# Patient Record
Sex: FEMALE | Race: WHITE | Employment: OTHER | URBAN - METROPOLITAN AREA
[De-identification: names, ages, dates, MRNs, and addresses within clinical notes are randomized per-mention and may not be internally consistent; named-entity substitution may affect disease eponyms.]

---

## 2017-08-30 ENCOUNTER — OFFICE VISIT (OUTPATIENT)
Dept: SURGERY | Age: 72
End: 2017-08-30

## 2017-08-30 ENCOUNTER — DOCUMENTATION ONLY (OUTPATIENT)
Dept: SURGERY | Age: 72
End: 2017-08-30

## 2017-08-30 ENCOUNTER — TELEPHONE (OUTPATIENT)
Dept: SURGERY | Age: 72
End: 2017-08-30

## 2017-08-30 VITALS
BODY MASS INDEX: 22.5 KG/M2 | WEIGHT: 140 LBS | DIASTOLIC BLOOD PRESSURE: 78 MMHG | HEART RATE: 67 BPM | HEIGHT: 66 IN | SYSTOLIC BLOOD PRESSURE: 135 MMHG

## 2017-08-30 DIAGNOSIS — N60.92 ATYPICAL LOBULAR HYPERPLASIA (ALH) OF LEFT BREAST: Primary | ICD-10-CM

## 2017-08-30 RX ORDER — LEVOTHYROXINE SODIUM 75 UG/1
TABLET ORAL
Refills: 0 | COMMUNITY
Start: 2017-08-13

## 2017-08-30 RX ORDER — SULFAMETHOXAZOLE AND TRIMETHOPRIM 800; 160 MG/1; MG/1
TABLET ORAL
Refills: 1 | COMMUNITY
Start: 2017-08-25

## 2017-08-30 RX ORDER — SIMVASTATIN 40 MG/1
TABLET, FILM COATED ORAL
Refills: 1 | COMMUNITY
Start: 2017-08-25

## 2017-08-30 NOTE — PROGRESS NOTES
Type of Film: [x] CD [] FILMS  Type of Test: [] MRI [x] MAMMO  From: [facility]  Given to:  [facility] [address]  To be Downloaded into PACS:  YES    Radiologist will review films for Dr. Lesly Lara prior to third biopsy.

## 2017-08-30 NOTE — PATIENT INSTRUCTIONS
Open Breast Biopsy: Before Your Surgery  What is an open breast biopsy? An open breast biopsy is surgery to take a sample of breast tissue. It may be done to check a lump found during a breast exam. Or it may be done to check an area of concern found on a mammogram or ultrasound. If the doctor can't feel the lump, a small wire can be put in the area during a mammogram or ultrasound just before surgery. The tip of the wire will guide the doctor to the area to be checked. The doctor will make a small cut in the breast to remove a piece of tissue. The cut is called an incision. If the lump or suspicious area is small, the doctor may be able to take out the entire lump or area. The doctor will close the incision with stitches. The breast tissue will be sent to a lab. There it will be examined under a microscope to check for breast cancer. Your doctor may get some answers right away. But it can take up to 1 to 2 weeks to get the final results. You will be able to go home on the same day as the biopsy. Most women are able to go back to work in 1 or 2 days. This depends on how you feel and the type of work you do. For 2 weeks after surgery, you will need to avoid bouncing and strenuous activities that involve the upper body. The surgery will leave a small scar on your breast that will fade with time. Less often, the surgery may leave a dent in the breast. You may be able to feel a hard area where the biopsy was done. This is a normal part of the healing process. It does not mean that the lump is growing back. The area will get softer in the weeks after surgery. Follow-up care is a key part of your treatment and safety. Be sure to make and go to all appointments, and call your doctor if you are having problems. It's also a good idea to know your test results and keep a list of the medicines you take. What happens before surgery? Surgery can be stressful.  This information will help you understand what you can expect. And it will help you safely prepare for surgery. Preparing for surgery  · Understand exactly what surgery is planned, along with the risks, benefits, and other options. · Tell your doctors ALL the medicines, vitamins, supplements, and herbal remedies you take. Some of these can increase the risk of bleeding or interact with anesthesia. · If you take blood thinners, such as warfarin (Coumadin), clopidogrel (Plavix), or aspirin, be sure to talk to your doctor. He or she will tell you if you should stop taking these medicines before your surgery. Make sure that you understand exactly what your doctor wants you to do. · Your doctor will tell you which medicines to take or stop before your surgery. You may need to stop taking certain medicines a week or more before surgery. So talk to your doctor as soon as you can. · If you have an advance directive, let your doctor know. It may include a living will and a durable power of  for health care. Bring a copy to the hospital. If you don't have one, you may want to prepare one. It lets your doctor and loved ones know your health care wishes. Doctors advise that everyone prepare these papers before any type of surgery or procedure. What happens on the day of surgery? · Follow the instructions exactly about when to stop eating and drinking. If you don't, your surgery may be canceled. If your doctor told you to take your medicines on the day of surgery, take them with only a sip of water. · Take a bath or shower before you come in for your surgery. Do not apply lotions, perfumes, deodorants, or nail polish. · Do not shave the surgical site yourself. · Take off all jewelry and piercings. And take out contact lenses, if you wear them. · Bring a comfortable, supportive bra with you. For the first 3 days after surgery, you will need to wear this all the time, even at night. At the hospital or surgery center  · Bring a picture ID.   · The area for surgery is often marked to make sure there are no errors. If needed, you will get a mammogram or ultrasound to diaana the area. · You will be kept comfortable and safe by your anesthesia provider. The anesthesia may make you sleep. Or it may just numb the area being worked on. · The surgery will take about 1 hour. Going home  · Be sure you have someone to drive you home. Anesthesia and pain medicine make it unsafe for you to drive. · You will be given more specific instructions about recovering from your surgery. They will cover things like diet, wound care, follow-up care, driving, and getting back to your normal routine. When should you call your doctor? · You have questions or concerns. · You don't understand how to prepare for your surgery. · You become ill before the surgery (such as fever, flu, or a cold). · You need to reschedule or have changed your mind about having the surgery. Where can you learn more? Go to http://andie-maggie.info/. Enter P389 in the search box to learn more about \"Open Breast Biopsy: Before Your Surgery. \"  Current as of: July 26, 2016  Content Version: 11.3  © 8148-8457 [employer]. Care instructions adapted under license by [employer] (which disclaims liability or warranty for this information). If you have questions about a medical condition or this instruction, always ask your healthcare professional. [address] any warranty or liability for your use of this information.

## 2017-08-30 NOTE — PROGRESS NOTES
HISTORY OF PRESENT ILLNESS  Jose Braga is a 67 y.o. female. HPI   NEW patient presents for consultation at the request of Dr. Brent Chambers for new diagnosis of LEFT breast [place]. Feels no palpable breast lumps, has no breast pain, no nipple discharge. Does have a somewhat inverted nipple on the LEFT, but this is not new. Screening mammogram revealed two abnormal areas which were biopsied separately with stereotactic biopsy. Second biopsy performed on the LEFT breast was benign, per patient. Had a breast MRI yesterday, and the radiologist is recommending a biopsy of a third site in the LEFT breast.  Patient is quite bruised after her two recent biopsies. She has a history of an excisional biopsy of the LEFT breast in 2010 by Dr. Martin Villaseñor for atypical lobular hyperplasia. FH is significant for a paternal aunt who had breast cancer in her 35s (survivor). All recent imaging has been at [facility]. Past Medical History:   Diagnosis Date    Atypical lobular hyperplasia [facility]) of left breast 2010, 2017       Past Surgical History:   Procedure Laterality Date    BIOPSY OF BREAST, INCISIONAL Left 2010    for [place], by Dr. Leroy Barry History     Social History    Marital status:      Spouse name: N/A    Number of children: N/A    Years of education: N/A     Occupational History    Not on file. Social History Main Topics    Smoking status: Former Smoker     Quit date: 8/30/1979    Smokeless tobacco: Never Used    Alcohol use Yes    Drug use: Not on file    Sexual activity: Not on file     Other Topics Concern    Not on file     Social History Narrative    No narrative on file       No current outpatient prescriptions on file prior to visit. No current facility-administered medications on file prior to visit. No Known Allergies    OB History     Obstetric Comments    Menarche:  15. LMP: 39.  # of Children:  3.   Age at Delivery of First Child:  21.   Hysterectomy/oophorectomy:  NO/NO. Breast Bx:  Yes, LEFT times three. Hx of Breast Feeding:  Yes. BCP:  No. Hormone therapy:  Yes, in the past.             ROS  Constitutional: Negative    HENT: Negative. Eyes: Negative. Respiratory: Negative. Cardiovascular: Negative. Gastrointestinal: Negative. Genitourinary: Negative. Musculoskeletal: Negative. Skin: Negative. Neurological: Negative. Endo/Heme/Allergies: Negative. Psychiatric/Behavioral: Negative. Physical Exam   Cardiovascular: Normal rate and normal heart sounds. Pulmonary/Chest: Breath sounds normal. Right breast exhibits no inverted nipple, no mass, no nipple discharge, no skin change and no tenderness. Left breast exhibits no inverted nipple, no mass, no nipple discharge, no skin change and no tenderness. Breasts are symmetrical.       Lymphadenopathy:        Right cervical: No superficial cervical, no deep cervical and no posterior cervical adenopathy present. Left cervical: No superficial cervical, no deep cervical and no posterior cervical adenopathy present. Right axillary: No pectoral and no lateral adenopathy present. Left axillary: No pectoral and no lateral adenopathy present. BREAST ULTRASOUND, Preop planning  Indication:preop planning  left Breast 12:00   Technique: The area was scanned using a high-frequency linear-array near-field transducer  Findings: Biopsy tract visible with ultrasound  Impression: ALH   Disposition:  Will schedule lumpectomy with wire localization    BREAST ULTRASOUND  Indication: left breast pain 6:00  Technique: The area was scanned using a high-frequency linear-array near-field transducer  Findings: Hyperechoic area of dark fluid  Impression: Small hematoma from bx  Disposition: No worrisome finding on ultrasound    ASSESSMENT and PLAN    ICD-10-CM ICD-9-CM    1.  Atypical lobular hyperplasia (ALH) of left breast N60.92 610.8      Pt with recently dx of LEFT breast ALH and presents today with abnormal MRI of LEFT breast. Discussed in-depth the pathology results and need for surgical excision. Discussed this procedure including incision placement and recovery time. Will have pt's MRI reviewed by our radiologist and f/u once their input becomes available. Will schedule LEFT breast excision of ALH with wire loc. This plan was reviewed with the patient and patient agrees. All questions were answered.     Written by Carmelina Up, as dictated by Dr. Tamara Colbert MD.

## 2017-08-30 NOTE — COMMUNICATION BODY
HISTORY OF PRESENT ILLNESS  Jose Braga is a 67 y.o. female. HPI   NEW patient presents for consultation at the request of Dr. Brent Chambers for new diagnosis of LEFT breast [place]. Feels no palpable breast lumps, has no breast pain, no nipple discharge. Does have a somewhat inverted nipple on the LEFT, but this is not new. Screening mammogram revealed two abnormal areas which were biopsied separately with stereotactic biopsy. Second biopsy performed on the LEFT breast was benign, per patient. Had a breast MRI yesterday, and the radiologist is recommending a biopsy of a third site in the LEFT breast.  Patient is quite bruised after her two recent biopsies. She has a history of an excisional biopsy of the LEFT breast in 2010 by Dr. Martin Villaseñor for atypical lobular hyperplasia. FH is significant for a paternal aunt who had breast cancer in her 35s (survivor). All recent imaging has been at [facility]. Past Medical History:   Diagnosis Date    Atypical lobular hyperplasia [facility]) of left breast 2010, 2017       Past Surgical History:   Procedure Laterality Date    BIOPSY OF BREAST, INCISIONAL Left 2010    for [place], by Dr. Leroy Barry History     Social History    Marital status:      Spouse name: N/A    Number of children: N/A    Years of education: N/A     Occupational History    Not on file. Social History Main Topics    Smoking status: Former Smoker     Quit date: 8/30/1979    Smokeless tobacco: Never Used    Alcohol use Yes    Drug use: Not on file    Sexual activity: Not on file     Other Topics Concern    Not on file     Social History Narrative    No narrative on file       No current outpatient prescriptions on file prior to visit. No current facility-administered medications on file prior to visit. No Known Allergies    OB History     Obstetric Comments    Menarche:  15. LMP: 39.  # of Children:  3.   Age at Delivery of First Child:  21.   Hysterectomy/oophorectomy:  NO/NO. Breast Bx:  Yes, LEFT times three. Hx of Breast Feeding:  Yes. BCP:  No. Hormone therapy:  Yes, in the past.             ROS  Constitutional: Negative    HENT: Negative. Eyes: Negative. Respiratory: Negative. Cardiovascular: Negative. Gastrointestinal: Negative. Genitourinary: Negative. Musculoskeletal: Negative. Skin: Negative. Neurological: Negative. Endo/Heme/Allergies: Negative. Psychiatric/Behavioral: Negative. Physical Exam   Cardiovascular: Normal rate and normal heart sounds. Pulmonary/Chest: Breath sounds normal. Right breast exhibits no inverted nipple, no mass, no nipple discharge, no skin change and no tenderness. Left breast exhibits no inverted nipple, no mass, no nipple discharge, no skin change and no tenderness. Breasts are symmetrical.       Lymphadenopathy:        Right cervical: No superficial cervical, no deep cervical and no posterior cervical adenopathy present. Left cervical: No superficial cervical, no deep cervical and no posterior cervical adenopathy present. Right axillary: No pectoral and no lateral adenopathy present. Left axillary: No pectoral and no lateral adenopathy present. BREAST ULTRASOUND, Preop planning  Indication:preop planning  left Breast 12:00   Technique: The area was scanned using a high-frequency linear-array near-field transducer  Findings: Biopsy tract visible with ultrasound  Impression: ALH   Disposition:  Will schedule lumpectomy with wire localization    BREAST ULTRASOUND  Indication: left breast pain 6:00  Technique: The area was scanned using a high-frequency linear-array near-field transducer  Findings: Hyperechoic area of dark fluid  Impression: Small hematoma from bx  Disposition: No worrisome finding on ultrasound    ASSESSMENT and PLAN    ICD-10-CM ICD-9-CM    1.  Atypical lobular hyperplasia (ALH) of left breast N60.92 610.8      Pt with recently dx of LEFT breast ALH and presents today with abnormal MRI of LEFT breast. Discussed in-depth the pathology results and need for surgical excision. Discussed this procedure including incision placement and recovery time. Will have pt's MRI reviewed by our radiologist and f/u once their input becomes available. Will schedule LEFT breast excision of ALH with wire loc. This plan was reviewed with the patient and patient agrees. All questions were answered.     Written by Salima Wetzel, as dictated by Dr. Theresa Carbone MD.

## 2017-08-30 NOTE — PROGRESS NOTES
HISTORY OF PRESENT ILLNESS  Jose Braga is a 67 y.o. female. HPI   NEW patient presents for consultation at the request of Dr. Allyson Werner for new diagnosis of LEFT breast [place]. Feels no palpable breast lumps, has no breast pain, no nipple discharge. Does have a somewhat inverted nipple on the LEFT, but this is not new. Screening mammogram revealed two abnormal areas which were biopsied separately with stereotactic biopsy. Second biopsy performed on the LEFT breast was benign, per patient. Had a breast MRI yesterday, and the radiologist is recommending a biopsy of a third site in the LEFT breast.  Patient is quite bruised after her two recent biopsies. She has a history of an excisional biopsy of the LEFT breast in 2010 by Dr. Patricio Motley for atypical lobular hyperplasia. FH is significant for a paternal aunt who had breast cancer in her 35s (survivor). All recent imaging has been at [facility]. Review of Systems   Constitutional: Negative. HENT: Negative. Eyes: Negative. Respiratory: Negative. Cardiovascular: Negative. Gastrointestinal: Negative. Genitourinary: Negative. Musculoskeletal: Negative. Skin: Negative. Neurological: Negative. Endo/Heme/Allergies: Negative. Psychiatric/Behavioral: Negative.         Physical Exam    ASSESSMENT and PLAN  {ASSESSMENT/PLAN:[id number]}

## 2017-08-30 NOTE — LETTER
8/30/2017 1:12 PM 
 
Patient:  Jose Braga YOB: 1945 Date of Visit: 8/30/2017 Dear Dr. Anisa Gagnon: Thank you for referring Ms. Jose Braga to me for evaluation/treatment. Below are the relevant portions of my assessment and plan of care. HISTORY OF PRESENT ILLNESS Mariely Roa is a 67 y.o. female. HPI 
 NEW patient presents for consultation at the request of Dr. Anisa Gagnon for new diagnosis of LEFT breast [place]. Feels no palpable breast lumps, has no breast pain, no nipple discharge. Does have a somewhat inverted nipple on the LEFT, but this is not new. Screening mammogram revealed two abnormal areas which were biopsied separately with stereotactic biopsy. Second biopsy performed on the LEFT breast was benign, per patient. Had a breast MRI yesterday, and the radiologist is recommending a biopsy of a third site in the LEFT breast.  Patient is quite bruised after her two recent biopsies. She has a history of an excisional biopsy of the LEFT breast in 2010 by Dr. Juju Swain for atypical lobular hyperplasia. FH is significant for a paternal aunt who had breast cancer in her 35s (survivor). All recent imaging has been at [facility]. Past Medical History:  
Diagnosis Date  Atypical lobular hyperplasia [facility]) of left breast 2010, 2017 Past Surgical History:  
Procedure Laterality Date  BIOPSY OF BREAST, INCISIONAL Left 2010  
 for [place], by Dr. Juju Swain  HX LUMBAR DISKECTOMY  [id number] Social History Social History  Marital status:  Spouse name: N/A  
 Number of children: N/A  
 Years of education: N/A Occupational History  Not on file. Social History Main Topics  Smoking status: Former Smoker Quit date: 8/30/1979  Smokeless tobacco: Never Used  Alcohol use Yes  Drug use: Not on file  Sexual activity: Not on file Other Topics Concern  Not on file Social History Narrative  No narrative on file No current outpatient prescriptions on file prior to visit. No current facility-administered medications on file prior to visit. No Known Allergies OB History Obstetric Comments Menarche:  15. LMP: 39.  # of Children:  3. Age at Delivery of First Child:  21.   Hysterectomy/oophorectomy:  NO/NO. Breast Bx:  Yes, LEFT times three. Hx of Breast Feeding:  Yes. BCP:  No. Hormone therapy:  Yes, in the past.  
  
  
 
 
ROS Constitutional: Negative HENT: Negative. Eyes: Negative. Respiratory: Negative. Cardiovascular: Negative. Gastrointestinal: Negative. Genitourinary: Negative. Musculoskeletal: Negative. Skin: Negative. Neurological: Negative. Endo/Heme/Allergies: Negative. Psychiatric/Behavioral: Negative. Physical Exam  
Cardiovascular: Normal rate and normal heart sounds. Pulmonary/Chest: Breath sounds normal. Right breast exhibits no inverted nipple, no mass, no nipple discharge, no skin change and no tenderness. Left breast exhibits no inverted nipple, no mass, no nipple discharge, no skin change and no tenderness. Breasts are symmetrical.  
 
 
Lymphadenopathy:  
     Right cervical: No superficial cervical, no deep cervical and no posterior cervical adenopathy present. Left cervical: No superficial cervical, no deep cervical and no posterior cervical adenopathy present. Right axillary: No pectoral and no lateral adenopathy present. Left axillary: No pectoral and no lateral adenopathy present. BREAST ULTRASOUND, Preop planning Indication:preop planning  left Breast 12:00 Technique: The area was scanned using a high-frequency linear-array near-field transducer Findings: Biopsy tract visible with ultrasound Impression: [facility] Disposition:  Will schedule lumpectomy with wire localization BREAST ULTRASOUND Indication: left breast pain 6:00 Technique:   The area was scanned using a high-frequency linear-array near-field transducer Findings: Hyperechoic area of dark fluid Impression: Small hematoma from bx Disposition: No worrisome finding on ultrasound ASSESSMENT and PLAN 
  ICD-10-CM ICD-9-CM 1. Atypical lobular hyperplasia [facility]) of left breast N60.92 610.8 Pt with recently dx of LEFT breast ALH and presents today with abnormal MRI of LEFT breast. Discussed in-depth the pathology results and need for surgical excision. Discussed this procedure including incision placement and recovery time. Will have pt's MRI reviewed by our radiologist and f/u once their input becomes available. Will schedule LEFT breast excision of ALH with wire loc. This plan was reviewed with the patient and patient agrees. All questions were answered. Written by Maikel Post, as dictated by Dr. Amee Bermudez MD.  
 
 
If you have questions, please do not hesitate to call me. I look forward to following Ms. Braga along with you.  
 
 
 
Sincerely, 
 
 
Shaun Jeronimo., MD

## 2017-09-05 NOTE — TELEPHONE ENCOUNTER
SURGERY SCHEDULED AT [facility] ON 10-19-17 AT 9 AM; NEEDLE LOC AT 7:30 AM; PATIENT WILL CHECK IN AT 6:30 AM    PREADMISSION TESTING AT [facility] ON 10-16-17 9:30 AM; PATIENT WILL CHECK IN BY 9 AM    POST OP VISIT AT [facility] AT 11-24-17 AT 8:45 AM    PATIENT HAS BEEN CALLED AND GIVEN INSTRUCTIONS

## 2017-10-16 ENCOUNTER — HOSPITAL ENCOUNTER (OUTPATIENT)
Dept: PREADMISSION TESTING | Age: 72
Discharge: HOME | End: 2017-10-16
Payer: COMMERCIAL

## 2017-10-16 VITALS
HEIGHT: 66 IN | TEMPERATURE: 98.3 F | OXYGEN SATURATION: 97 % | RESPIRATION RATE: 14 BRPM | SYSTOLIC BLOOD PRESSURE: 95 MMHG | DIASTOLIC BLOOD PRESSURE: 63 MMHG | WEIGHT: 137 LBS | BODY MASS INDEX: 22.02 KG/M2

## 2017-10-16 DIAGNOSIS — N60.92 ATYPICAL LOBULAR HYPERPLASIA OF LEFT BREAST: Primary | ICD-10-CM

## 2017-10-16 DIAGNOSIS — N60.92 ATYPICAL LOBULAR HYPERPLASIA OF LEFT BREAST: ICD-10-CM

## 2017-10-16 LAB
ATRIAL RATE: 62 BPM
BASOPHILS # BLD: 0 K/UL (ref 0–0.1)
BASOPHILS NFR BLD: 0 % (ref 0–1)
CALCULATED P AXIS, ECG09: 38 DEGREES
CALCULATED R AXIS, ECG10: 37 DEGREES
CALCULATED T AXIS, ECG11: 42 DEGREES
DIAGNOSIS, 93000: NORMAL
EOSINOPHIL # BLD: 0 K/UL (ref 0–0.4)
EOSINOPHIL NFR BLD: 1 % (ref 0–7)
ERYTHROCYTE [DISTWIDTH] IN BLOOD BY AUTOMATED COUNT: 12.8 % (ref 11.5–14.5)
HCT VFR BLD AUTO: 40.3 % (ref 35–47)
HGB BLD-MCNC: 13 G/DL (ref 11.5–16)
LYMPHOCYTES # BLD: 1.5 K/UL (ref 0.8–3.5)
LYMPHOCYTES NFR BLD: 33 % (ref 12–49)
MCH RBC QN AUTO: 29.1 PG (ref 26–34)
MCHC RBC AUTO-ENTMCNC: 32.3 G/DL (ref 30–36.5)
MCV RBC AUTO: 90.2 FL (ref 80–99)
MONOCYTES # BLD: 0.6 K/UL (ref 0–1)
MONOCYTES NFR BLD: 13 % (ref 5–13)
NEUTS SEG # BLD: 2.4 K/UL (ref 1.8–8)
NEUTS SEG NFR BLD: 53 % (ref 32–75)
P-R INTERVAL, ECG05: 172 MS
PLATELET # BLD AUTO: 177 K/UL (ref 150–400)
Q-T INTERVAL, ECG07: 396 MS
QRS DURATION, ECG06: 72 MS
QTC CALCULATION (BEZET), ECG08: 401 MS
RBC # BLD AUTO: 4.47 M/UL (ref 3.8–5.2)
VENTRICULAR RATE, ECG03: 62 BPM
WBC # BLD AUTO: 4.4 K/UL (ref 3.6–11)

## 2017-10-16 PROCEDURE — 85025 COMPLETE CBC W/AUTO DIFF WBC: CPT | Performed by: SURGERY

## 2017-10-16 PROCEDURE — 93005 ELECTROCARDIOGRAM TRACING: CPT

## 2017-10-16 PROCEDURE — 36415 COLL VENOUS BLD VENIPUNCTURE: CPT | Performed by: SURGERY

## 2017-10-16 RX ORDER — CALCIUM POLYCARBOPHIL 625 MG
625 TABLET ORAL DAILY
COMMUNITY

## 2017-10-16 RX ORDER — MULTIVITAMIN
1 TABLET ORAL DAILY
COMMUNITY

## 2017-10-16 RX ORDER — GLUCOSAMINE/CHONDR SU A SOD 750-600 MG
TABLET ORAL
COMMUNITY

## 2017-10-16 RX ORDER — ASPIRIN 81 MG/1
TABLET ORAL DAILY
COMMUNITY

## 2017-10-16 RX ORDER — LANOLIN ALCOHOL/MO/W.PET/CERES
400 CREAM (GRAM) TOPICAL DAILY
COMMUNITY

## 2017-10-16 RX ORDER — LANOLIN ALCOHOL/MO/W.PET/CERES
500 CREAM (GRAM) TOPICAL DAILY
COMMUNITY

## 2017-10-16 RX ORDER — GLUCOSAMINE SULFATE 1500 MG
POWDER IN PACKET (EA) ORAL DAILY
COMMUNITY

## 2017-10-16 RX ORDER — BISMUTH SUBSALICYLATE 262 MG
1 TABLET,CHEWABLE ORAL DAILY
COMMUNITY

## 2017-10-18 ENCOUNTER — TELEPHONE (OUTPATIENT)
Dept: SURGERY | Age: 72
End: 2017-10-18

## 2017-10-18 ENCOUNTER — ANESTHESIA EVENT (OUTPATIENT)
Dept: MEDSURG UNIT | Age: 72
End: 2017-10-18
Payer: COMMERCIAL

## 2017-10-18 NOTE — TELEPHONE ENCOUNTER
Marian Umana at [address] [place] [address] and confirmed with her that the only area needing NL tomorrow is in the [facility] in the LEFT breast.  MRI was abnormal, but I let her know per Dr. Yani Mendieta that he reviewed this with the radiologist and nothing further needs to be done for any other areas.

## 2017-10-19 ENCOUNTER — ANESTHESIA (OUTPATIENT)
Dept: MEDSURG UNIT | Age: 72
End: 2017-10-19
Payer: COMMERCIAL

## 2017-10-19 ENCOUNTER — APPOINTMENT (OUTPATIENT)
Dept: MAMMOGRAPHY | Age: 72
End: 2017-10-19
Attending: SURGERY
Payer: COMMERCIAL

## 2017-10-19 ENCOUNTER — HOSPITAL ENCOUNTER (OUTPATIENT)
Age: 72
Setting detail: OUTPATIENT SURGERY
Discharge: HOME | End: 2017-10-19
Attending: SURGERY | Admitting: SURGERY
Payer: COMMERCIAL

## 2017-10-19 VITALS
RESPIRATION RATE: 20 BRPM | TEMPERATURE: 97.5 F | OXYGEN SATURATION: 96 % | SYSTOLIC BLOOD PRESSURE: 106 MMHG | HEART RATE: 60 BPM | DIASTOLIC BLOOD PRESSURE: 61 MMHG | BODY MASS INDEX: 22.02 KG/M2 | WEIGHT: 137 LBS | HEIGHT: 66 IN

## 2017-10-19 DIAGNOSIS — N63.20 MASS OF LEFT BREAST: ICD-10-CM

## 2017-10-19 PROCEDURE — 74011000250 HC RX REV CODE- 250: Performed by: RADIOLOGY

## 2017-10-19 PROCEDURE — 77030002996 HC SUT SLK J&J -A: Performed by: SURGERY

## 2017-10-19 PROCEDURE — 77030011267 HC ELECTRD BLD COVD -A: Performed by: SURGERY

## 2017-10-19 PROCEDURE — 74011250636 HC RX REV CODE- 250/636

## 2017-10-19 PROCEDURE — 74011000250 HC RX REV CODE- 250: Performed by: SURGERY

## 2017-10-19 PROCEDURE — 77030002933 HC SUT MCRYL J&J -A: Performed by: SURGERY

## 2017-10-19 PROCEDURE — 77030020782 HC GWN BAIR PAWS FLX 3M -B

## 2017-10-19 PROCEDURE — 76210000034 HC AMBSU PH I REC 0.5 TO 1 HR: Performed by: SURGERY

## 2017-10-19 PROCEDURE — 74011000250 HC RX REV CODE- 250

## 2017-10-19 PROCEDURE — 77030020268 HC MISC GENERAL SUPPLY: Performed by: SURGERY

## 2017-10-19 PROCEDURE — 77030010507 HC ADH SKN DERMBND J&J -B: Performed by: SURGERY

## 2017-10-19 PROCEDURE — 76210000050 HC AMBSU PH II REC 0.5 TO 1 HR: Performed by: SURGERY

## 2017-10-19 PROCEDURE — 88305 TISSUE EXAM BY PATHOLOGIST: CPT | Performed by: SURGERY

## 2017-10-19 PROCEDURE — 76060000061 HC AMB SURG ANES 0.5 TO 1 HR: Performed by: SURGERY

## 2017-10-19 PROCEDURE — 74011250636 HC RX REV CODE- 250/636: Performed by: ANESTHESIOLOGY

## 2017-10-19 PROCEDURE — 77030018836 HC SOL IRR NACL ICUM -A: Performed by: SURGERY

## 2017-10-19 PROCEDURE — 77030031139 HC SUT VCRL2 J&J -A: Performed by: SURGERY

## 2017-10-19 PROCEDURE — 77030011640 HC PAD GRND REM COVD -A: Performed by: SURGERY

## 2017-10-19 PROCEDURE — 76030000000 HC AMB SURG OR TIME 0.5 TO 1: Performed by: SURGERY

## 2017-10-19 PROCEDURE — 77030032490 HC SLV COMPR SCD KNE COVD -B: Performed by: SURGERY

## 2017-10-19 PROCEDURE — 19283 PERQ DEV BREAST 1ST STRTCTC: CPT

## 2017-10-19 RX ORDER — MIDAZOLAM HYDROCHLORIDE 1 MG/ML
INJECTION, SOLUTION INTRAMUSCULAR; INTRAVENOUS AS NEEDED
Status: DISCONTINUED | OUTPATIENT
Start: 2017-10-19 | End: 2017-10-19 | Stop reason: HOSPADM

## 2017-10-19 RX ORDER — SODIUM CHLORIDE, SODIUM LACTATE, POTASSIUM CHLORIDE, CALCIUM CHLORIDE 600; 310; 30; 20 MG/100ML; MG/100ML; MG/100ML; MG/100ML
INJECTION, SOLUTION INTRAVENOUS
Status: DISCONTINUED | OUTPATIENT
Start: 2017-10-19 | End: 2017-10-19 | Stop reason: HOSPADM

## 2017-10-19 RX ORDER — SODIUM CHLORIDE 0.9 % (FLUSH) 0.9 %
5-10 SYRINGE (ML) INJECTION AS NEEDED
Status: DISCONTINUED | OUTPATIENT
Start: 2017-10-19 | End: 2017-10-19 | Stop reason: HOSPADM

## 2017-10-19 RX ORDER — LIDOCAINE HYDROCHLORIDE 10 MG/ML
5 INJECTION INFILTRATION; PERINEURAL ONCE
Status: COMPLETED | OUTPATIENT
Start: 2017-10-19 | End: 2017-10-19

## 2017-10-19 RX ORDER — LIDOCAINE HYDROCHLORIDE 10 MG/ML
0.1 INJECTION, SOLUTION EPIDURAL; INFILTRATION; INTRACAUDAL; PERINEURAL AS NEEDED
Status: DISCONTINUED | OUTPATIENT
Start: 2017-10-19 | End: 2017-10-19 | Stop reason: HOSPADM

## 2017-10-19 RX ORDER — KETAMINE HYDROCHLORIDE 10 MG/ML
INJECTION, SOLUTION INTRAMUSCULAR; INTRAVENOUS AS NEEDED
Status: DISCONTINUED | OUTPATIENT
Start: 2017-10-19 | End: 2017-10-19 | Stop reason: HOSPADM

## 2017-10-19 RX ORDER — HYDROCODONE BITARTRATE AND ACETAMINOPHEN 7.5; 325 MG/1; MG/1
1 TABLET ORAL
Qty: 25 TAB | Refills: 0 | Status: SHIPPED | OUTPATIENT
Start: 2017-10-19 | End: 2017-11-22

## 2017-10-19 RX ORDER — BUPIVACAINE HYDROCHLORIDE AND EPINEPHRINE 5; 5 MG/ML; UG/ML
30 INJECTION, SOLUTION EPIDURAL; INTRACAUDAL; PERINEURAL ONCE
Status: COMPLETED | OUTPATIENT
Start: 2017-10-19 | End: 2017-10-19

## 2017-10-19 RX ORDER — ONDANSETRON 2 MG/ML
INJECTION INTRAMUSCULAR; INTRAVENOUS AS NEEDED
Status: DISCONTINUED | OUTPATIENT
Start: 2017-10-19 | End: 2017-10-19 | Stop reason: HOSPADM

## 2017-10-19 RX ORDER — LIDOCAINE HYDROCHLORIDE AND EPINEPHRINE 10; 10 MG/ML; UG/ML
30 INJECTION, SOLUTION INFILTRATION; PERINEURAL ONCE
Status: COMPLETED | OUTPATIENT
Start: 2017-10-19 | End: 2017-10-19

## 2017-10-19 RX ORDER — FENTANYL CITRATE 50 UG/ML
INJECTION, SOLUTION INTRAMUSCULAR; INTRAVENOUS AS NEEDED
Status: DISCONTINUED | OUTPATIENT
Start: 2017-10-19 | End: 2017-10-19 | Stop reason: HOSPADM

## 2017-10-19 RX ORDER — PROPOFOL 10 MG/ML
INJECTION, EMULSION INTRAVENOUS
Status: DISCONTINUED | OUTPATIENT
Start: 2017-10-19 | End: 2017-10-19 | Stop reason: HOSPADM

## 2017-10-19 RX ORDER — HYDROMORPHONE HYDROCHLORIDE 1 MG/ML
0.2 INJECTION, SOLUTION INTRAMUSCULAR; INTRAVENOUS; SUBCUTANEOUS
Status: DISCONTINUED | OUTPATIENT
Start: 2017-10-19 | End: 2017-10-19 | Stop reason: HOSPADM

## 2017-10-19 RX ORDER — MORPHINE SULFATE 10 MG/ML
2 INJECTION, SOLUTION INTRAMUSCULAR; INTRAVENOUS
Status: DISCONTINUED | OUTPATIENT
Start: 2017-10-19 | End: 2017-10-19 | Stop reason: HOSPADM

## 2017-10-19 RX ORDER — ONDANSETRON 2 MG/ML
4 INJECTION INTRAMUSCULAR; INTRAVENOUS AS NEEDED
Status: DISCONTINUED | OUTPATIENT
Start: 2017-10-19 | End: 2017-10-19 | Stop reason: HOSPADM

## 2017-10-19 RX ORDER — SODIUM CHLORIDE, SODIUM LACTATE, POTASSIUM CHLORIDE, CALCIUM CHLORIDE 600; 310; 30; 20 MG/100ML; MG/100ML; MG/100ML; MG/100ML
50 INJECTION, SOLUTION INTRAVENOUS CONTINUOUS
Status: DISCONTINUED | OUTPATIENT
Start: 2017-10-19 | End: 2017-10-19 | Stop reason: HOSPADM

## 2017-10-19 RX ORDER — PROPOFOL 10 MG/ML
INJECTION, EMULSION INTRAVENOUS AS NEEDED
Status: DISCONTINUED | OUTPATIENT
Start: 2017-10-19 | End: 2017-10-19 | Stop reason: HOSPADM

## 2017-10-19 RX ORDER — SODIUM CHLORIDE 0.9 % (FLUSH) 0.9 %
5-10 SYRINGE (ML) INJECTION EVERY 8 HOURS
Status: DISCONTINUED | OUTPATIENT
Start: 2017-10-19 | End: 2017-10-19 | Stop reason: HOSPADM

## 2017-10-19 RX ORDER — FENTANYL CITRATE 50 UG/ML
25 INJECTION, SOLUTION INTRAMUSCULAR; INTRAVENOUS
Status: DISCONTINUED | OUTPATIENT
Start: 2017-10-19 | End: 2017-10-19 | Stop reason: HOSPADM

## 2017-10-19 RX ADMIN — SODIUM CHLORIDE, SODIUM LACTATE, POTASSIUM CHLORIDE, CALCIUM CHLORIDE: 600; 310; 30; 20 INJECTION, SOLUTION INTRAVENOUS at 09:31

## 2017-10-19 RX ADMIN — SODIUM CHLORIDE, SODIUM LACTATE, POTASSIUM CHLORIDE, AND CALCIUM CHLORIDE 50 ML/HR: 600; 310; 30; 20 INJECTION, SOLUTION INTRAVENOUS at 09:19

## 2017-10-19 RX ADMIN — ONDANSETRON 4 MG: 2 INJECTION INTRAMUSCULAR; INTRAVENOUS at 09:56

## 2017-10-19 RX ADMIN — FENTANYL CITRATE 50 MCG: 50 INJECTION, SOLUTION INTRAMUSCULAR; INTRAVENOUS at 09:43

## 2017-10-19 RX ADMIN — LIDOCAINE HYDROCHLORIDE 5 ML: 10 INJECTION, SOLUTION INFILTRATION; PERINEURAL at 08:00

## 2017-10-19 RX ADMIN — FENTANYL CITRATE 25 MCG: 50 INJECTION, SOLUTION INTRAMUSCULAR; INTRAVENOUS at 10:00

## 2017-10-19 RX ADMIN — KETAMINE HYDROCHLORIDE 10 MG: 10 INJECTION, SOLUTION INTRAMUSCULAR; INTRAVENOUS at 09:43

## 2017-10-19 RX ADMIN — PROPOFOL 100 MCG/KG/MIN: 10 INJECTION, EMULSION INTRAVENOUS at 09:43

## 2017-10-19 RX ADMIN — PROPOFOL 30 MG: 10 INJECTION, EMULSION INTRAVENOUS at 09:55

## 2017-10-19 RX ADMIN — MIDAZOLAM HYDROCHLORIDE 2 MG: 1 INJECTION, SOLUTION INTRAMUSCULAR; INTRAVENOUS at 09:41

## 2017-10-19 RX ADMIN — FENTANYL CITRATE 25 MCG: 50 INJECTION, SOLUTION INTRAMUSCULAR; INTRAVENOUS at 09:50

## 2017-10-19 NOTE — DISCHARGE INSTRUCTIONS
Discharge Instructions from Dr. Vimal Galvan    · I will call you with the pathology results, typically within 1 week from today. · You may shower, but no hot tubs, swimming pools, or baths until your incision is healed. · No heavy lifting with the affected extremity (nothing greater than 5 pounds), and limit its use for the next 4-5 days. · You may use an ice pack for comfort for the next couple of days, but do not place ice directly on the skin. Rather, use a towel or clothing to serve as a barrier between skin and ice to prevent injury. · If I placed a drain, follow the drain instructions provided, especially as you keep a record of the drain output. · Follow medication instructions carefully. · Watch for signs of infection as listed below. · Redness  · Swelling  · Drainage from the incision or from your nipple that appears infected  · Fever over 101 degrees for consecutive readings, or over 99.5 if you are currently undergoing chemotherapy. · Call our office (number is below) for a follow-up appointment. · If you have any problems, our phone number is [phone]. DISCHARGE SUMMARY from Nurse    The following personal items are in your possession at time of discharge:    Dental Appliances: Partials  Visual Aid: Glasses        Jewelry: Ring (does not come off)  Clothing:  (clothes in locker)  Other Valuables: None             PATIENT INSTRUCTIONS:    After general anesthesia or intravenous sedation, for 24 hours or while taking prescription Narcotics:  Limit your activities  Do not drive and operate hazardous machinery  Do not make important personal or business decisions  Do  not drink alcoholic beverages  If you have not urinated within 8 hours after discharge, please contact your surgeon on call.     Report the following to your surgeon:  Excessive pain, swelling, redness or odor of or around the surgical area  Temperature over 100.5  Nausea and vomiting lasting longer than 4 hours or if unable to take medications  Any signs of decreased circulation or nerve impairment to extremity: change in color, persistent  numbness, tingling, coldness or increase pain  Any questions        What to do at Home:  Recommended activity: See surgical instructions,     If you experience any of the following symptoms as instructed, please follow up with Dr Ariela Dobbins. *  Please give a list of your current medications to your Primary Care Provider. *  Please update this list whenever your medications are discontinued, doses are      changed, or new medications (including over-the-counter products) are added. *  Please carry medication information at all times in case of emergency situations. These are general instructions for a healthy lifestyle:    No smoking/ No tobacco products/ Avoid exposure to second hand smoke    Surgeon General's Warning:  Quitting smoking now greatly reduces serious risk to your health. Obesity, smoking, and sedentary lifestyle greatly increases your risk for illness    A healthy diet, regular physical exercise & weight monitoring are important for maintaining a healthy lifestyle    You may be retaining fluid if you have a history of heart failure or if you experience any of the following symptoms:  Weight gain of 3 pounds or more overnight or 5 pounds in a week, increased swelling in our hands or feet or shortness of breath while lying flat in bed. Please call your doctor as soon as you notice any of these symptoms; do not wait until your next office visit. Recognize signs and symptoms of STROKE:    F-face looks uneven    A-arms unable to move or move unevenly    S-speech slurred or non-existent    T-time-call 911 as soon as signs and symptoms begin-DO NOT go       Back to bed or wait to see if you get better-TIME IS BRAIN. Warning Signs of HEART ATTACK     Call 911 if you have these symptoms:  Chest discomfort.  Most heart attacks involve discomfort in the center of the chest that lasts more than a few minutes, or that goes away and comes back. It can feel like uncomfortable pressure, squeezing, fullness, or pain. Discomfort in other areas of the upper body. Symptoms can include pain or discomfort in one or both arms, the back, neck, jaw, or stomach. Shortness of breath with or without chest discomfort. Other signs may include breaking out in a cold sweat, nausea, or lightheadedness. Don't wait more than five minutes to call 911 - MINUTES MATTER! Fast action can save your life. Calling 911 is almost always the fastest way to get lifesaving treatment. Emergency Medical Services staff can begin treatment when they arrive -- up to an hour sooner than if someone gets to the hospital by car. The discharge information has been reviewed with the patient and spouse. The patient and spouse verbalized understanding. Discharge medications reviewed with the patient and spouse and appropriate educational materials and side effects teaching were provided.

## 2017-10-19 NOTE — IP AVS SNAPSHOT
[address] 
[phone] Patient: Jose Braga MRN: [id number] :1945 Current Discharge Medication List  
  
START taking these medications Dose & Instructions Dispensing Information Comments Morning Noon Evening Bedtime HYDROcodone-acetaminophen 7.5-325 mg per tablet Commonly known as:  Mike Hernandez Your last dose was: Your next dose is:    
   
   
 Dose:  1 Tab Take 1 Tab by mouth every four (4) hours as needed for Pain. Max Daily Amount: 6 Tabs. Quantity:  25 Tab Refills:  0 CONTINUE these medications which have NOT CHANGED Dose & Instructions Dispensing Information Comments Morning Noon Evening Bedtime ALLEGRA PO Your last dose was: Your next dose is: Take  by mouth daily (with breakfast). Refills:  0  
     
   
   
   
  
 aspirin delayed-release 81 mg tablet Your last dose was: Your next dose is: Take  by mouth daily. Refills:  0 Biotin 2,500 mcg Cap Your last dose was: Your next dose is: Take  by mouth. Refills:  0  
     
   
   
   
  
 calcium polycarbophil 625 mg tablet Commonly known as:  Shruthi Kodak Your last dose was: Your next dose is:    
   
   
 Dose:  625 mg Take 625 mg by mouth daily. Refills:  0  
     
   
   
   
  
 calcium-cholecalciferol (D3) tablet Commonly known as:  CALTRATE 600+D Your last dose was: Your next dose is:    
   
   
 Dose:  1 Tab Take 1 Tab by mouth daily. Refills:  0 FIBER PO Your last dose was: Your next dose is: Take  by mouth two (2) times a day. Refills:  0  
     
   
   
   
  
 levothyroxine 75 mcg tablet Commonly known as:  SYNTHROID Your last dose was: Your next dose is: Refills:  0  
     
   
   
   
  
 magnesium oxide 400 mg tablet Commonly known as:  MAG-OX Your last dose was: Your next dose is:    
   
   
 Dose:  400 mg Take 400 mg by mouth daily. Refills:  0  
     
   
   
   
  
 multivitamin tablet Commonly known as:  ONE A DAY Your last dose was: Your next dose is:    
   
   
 Dose:  1 Tab Take 1 Tab by mouth daily. Refills:  0  
     
   
   
   
  
 OTHER Your last dose was: Your next dose is:    
   
   
 CO-Q-10 200 MG Refills:  0 PROBIOTIC 4X 10-15 mg Tbec Generic drug:  B.infantis-B.ani-B.long-B.bifi Your last dose was: Your next dose is: Take  by mouth two (2) times a day. Refills:  0  
     
   
   
   
  
 simvastatin 40 mg tablet Commonly known as:  ZOCOR Your last dose was: Your next dose is:    
   
   
 nightly. Refills:  1 THERACRAN PO Your last dose was: Your next dose is: Take  by mouth daily. Refills:  0  
     
   
   
   
  
 trimethoprim-sulfamethoxazole 160-800 mg per tablet Commonly known as:  BACTRIM DS, SEPTRA DS Your last dose was: Your next dose is:    
   
   
 nightly. Indications: PROPHALACTIC FOR BLADDER INFECTION. Refills:  1 VITAMIN B-12 500 mcg tablet Generic drug:  cyanocobalamin Your last dose was: Your next dose is:    
   
   
 Dose:  500 mcg Take 500 mcg by mouth daily. Refills:  0  
     
   
   
   
  
 VITAMIN D3 1,000 unit Cap Generic drug:  cholecalciferol Your last dose was: Your next dose is: Take  by mouth daily. Refills:  0  
     
   
   
   
  
 VITAMIN K2 PO Your last dose was: Your next dose is:    
   
   
 Dose:  100 mg Take 100 mg by mouth. Refills:  0 Where to Get Your Medications Information on where to get these meds will be given to you by the nurse or doctor. ! Ask your nurse or doctor about these medications HYDROcodone-acetaminophen 7.5-325 mg per tablet

## 2017-10-19 NOTE — OP NOTES
[address] [facility] 12, [address]   OP NOTE       Name:  Dortha Curling   MR#:  [id number]   :  1945   Account #:  [de-identified]    Surgery Date:  10/19/2017   Date of Adm:  10/19/2017       PREOPERATIVE DIAGNOSIS: Atypical lobular hyperplasia, left   breast.    POSTOPERATIVE DIAGNOSIS: Atypical lobular hyperplasia, left   breast.    PROCEDURES PERFORMED: Left breast biopsy with needle   localization. SURGEON: Speedy Tomas. Lavelle Riedel., MD    ANESTHESIA: Local standby. SPECIMENS REMOVED: Breast tissue. ESTIMATED BLOOD LOSS: Minimal.    INDICATIONS: The patient is a 79-year-old female with a history of   atypical lobular hyperplasia who had a core biopsy revealing the same. She is admitted at this time for excisional biopsy with needle   localization. DESCRIPTION OF PROCEDURE: After needle localization in   Radiology, the patient was brought to the operating room. After   adequate IV sedation, sterile prep and drape, local anesthesia with 1%   lidocaine mixed with 0.5% Marcaine, a curvilinear incision was made   just above the nipple-areolar complex on the left and was deepened   through subcutaneous tissue with Bovie cautery and the wires was   brought into the wound. The tissue around the tip of the wire was   removed. The specimen was oriented, radiographs were obtained   which revealed the presence of the clip within the specimen. All   dissection planes were hemostatic. The wound was closed with   interrupted 3-0 Vicryl and running subcuticular Monocryl on the skin. The patient tolerated the procedure well without complications. She   was taken to the recovery room in stable condition.         MD KAREN Mendiola / FRANCESCO   D:  10/19/2017   10:54   T:  10/19/2017   11:58   Job #:  [id number]     Ayden Angulo MD, [facility]

## 2017-10-19 NOTE — IP AVS SNAPSHOT
[address] 
[phone] Patient: Jose Braga MRN: [id number] :1945 You are allergic to the following No active allergies Recent Documentation Height Weight BMI OB Status Smoking Status 1.664 m 62.1 kg 22.45 kg/m2 Postmenopausal Former Smoker Emergency Contacts Name Discharge Info Relation Home Work Mobile [facility] DISCHARGE CAREGIVER [3] Spouse [3] [phone] About your hospitalization You were admitted on:  2017 You last received care in the:  [facility] PACU You were discharged on:  2017 Unit phone number:  [phone] Why you were hospitalized Your primary diagnosis was:  Not on File Providers Seen During Your Hospitalizations Provider Role Specialty Primary office phone Barbara Hanley MD Attending Provider Breast Surgery [phone] Your Primary Care Physician (PCP) Primary Care Physician Office Phone Office Fax Kiran Locke [phone] Follow-up Information Follow up With Details Comments Contact Info Mireille Ortega MD   [address] [address] 
[phone] Your Appointments 2017  1:30 PM EST  
POST OP with Barbara Hanley MD  
[address] [facility] at [address]  
[phone] Current Discharge Medication List  
  
START taking these medications Dose & Instructions Dispensing Information Comments Morning Noon Evening Bedtime HYDROcodone-acetaminophen 7.5-325 mg per tablet Commonly known as:  Cristina Giron Your last dose was: Your next dose is:    
   
   
 Dose:  1 Tab Take 1 Tab by mouth every four (4) hours as needed for Pain.  Max Daily Amount: 6 Tabs. Quantity:  25 Tab Refills:  0 CONTINUE these medications which have NOT CHANGED Dose & Instructions Dispensing Information Comments Morning Noon Evening Bedtime ALLEGRA PO Your last dose was: Your next dose is: Take  by mouth daily (with breakfast). Refills:  0  
     
   
   
   
  
 aspirin delayed-release 81 mg tablet Your last dose was: Your next dose is: Take  by mouth daily. Refills:  0 Biotin 2,500 mcg Cap Your last dose was: Your next dose is: Take  by mouth. Refills:  0  
     
   
   
   
  
 calcium polycarbophil 625 mg tablet Commonly known as:  Eluterio Stabs Your last dose was: Your next dose is:    
   
   
 Dose:  625 mg Take 625 mg by mouth daily. Refills:  0  
     
   
   
   
  
 calcium-cholecalciferol (D3) tablet Commonly known as:  CALTRATE 600+D Your last dose was: Your next dose is:    
   
   
 Dose:  1 Tab Take 1 Tab by mouth daily. Refills:  0 FIBER PO Your last dose was: Your next dose is: Take  by mouth two (2) times a day. Refills:  0  
     
   
   
   
  
 levothyroxine 75 mcg tablet Commonly known as:  SYNTHROID Your last dose was: Your next dose is:    
   
   
  Refills:  0  
     
   
   
   
  
 magnesium oxide 400 mg tablet Commonly known as:  MAG-OX Your last dose was: Your next dose is:    
   
   
 Dose:  400 mg Take 400 mg by mouth daily. Refills:  0  
     
   
   
   
  
 multivitamin tablet Commonly known as:  ONE A DAY Your last dose was: Your next dose is:    
   
   
 Dose:  1 Tab Take 1 Tab by mouth daily. Refills:  0  
     
   
   
   
  
 OTHER Your last dose was: Your next dose is:    
   
   
 CO-Q-10 200 MG Refills:  0 PROBIOTIC 4X 10-15 mg Tbec Generic drug:  B.infantis-B.ani-B.long-B.bifi Your last dose was: Your next dose is: Take  by mouth two (2) times a day. Refills:  0  
     
   
   
   
  
 simvastatin 40 mg tablet Commonly known as:  ZOCOR Your last dose was: Your next dose is:    
   
   
 nightly. Refills:  1 THERACRAN PO Your last dose was: Your next dose is: Take  by mouth daily. Refills:  0  
     
   
   
   
  
 trimethoprim-sulfamethoxazole 160-800 mg per tablet Commonly known as:  BACTRIM DS, SEPTRA DS Your last dose was: Your next dose is:    
   
   
 nightly. Indications: PROPHALACTIC FOR BLADDER INFECTION. Refills:  1 VITAMIN B-12 500 mcg tablet Generic drug:  cyanocobalamin Your last dose was: Your next dose is:    
   
   
 Dose:  500 mcg Take 500 mcg by mouth daily. Refills:  0  
     
   
   
   
  
 VITAMIN D3 1,000 unit Cap Generic drug:  cholecalciferol Your last dose was: Your next dose is: Take  by mouth daily. Refills:  0  
     
   
   
   
  
 VITAMIN K2 PO Your last dose was: Your next dose is:    
   
   
 Dose:  100 mg Take 100 mg by mouth. Refills:  0 Where to Get Your Medications Information on where to get these meds will be given to you by the nurse or doctor. ! Ask your nurse or doctor about these medications HYDROcodone-acetaminophen 7.5-325 mg per tablet Discharge Instructions Discharge Instructions from Dr. Bereket Matthew · I will call you with the pathology results, typically within 1 week from today. · You may shower, but no hot tubs, swimming pools, or baths until your incision is healed.  
· No heavy lifting with the affected extremity (nothing greater than 5 pounds), and limit its use for the next 4-5 days. · You may use an ice pack for comfort for the next couple of days, but do not place ice directly on the skin. Rather, use a towel or clothing to serve as a barrier between skin and ice to prevent injury. · If I placed a drain, follow the drain instructions provided, especially as you keep a record of the drain output. · Follow medication instructions carefully. · Watch for signs of infection as listed below. · Redness · Swelling · Drainage from the incision or from your nipple that appears infected · Fever over 101 degrees for consecutive readings, or over 99.5 if you are currently undergoing chemotherapy. · Call our office (number is below) for a follow-up appointment. · If you have any problems, our phone number is [phone]. DISCHARGE SUMMARY from Nurse The following personal items are in your possession at time of discharge: 
 
Dental Appliances: Partials Visual Aid: Glasses Jewelry: Ring (does not come off) Clothing:  (clothes in locker) Other Valuables: None PATIENT INSTRUCTIONS: 
 
After general anesthesia or intravenous sedation, for 24 hours or while taking prescription Narcotics: 
Limit your activities Do not drive and operate hazardous machinery Do not make important personal or business decisions Do  not drink alcoholic beverages If you have not urinated within 8 hours after discharge, please contact your surgeon on call. Report the following to your surgeon: Excessive pain, swelling, redness or odor of or around the surgical area Temperature over 100.5 Nausea and vomiting lasting longer than 4 hours or if unable to take medications Any signs of decreased circulation or nerve impairment to extremity: change in color, persistent  numbness, tingling, coldness or increase pain Any questions What to do at Home: 
Recommended activity: See surgical instructions,  
 
 If you experience any of the following symptoms as instructed, please follow up with Dr Lewis Mcdonald. *  Please give a list of your current medications to your Primary Care Provider. *  Please update this list whenever your medications are discontinued, doses are 
    changed, or new medications (including over-the-counter products) are added. *  Please carry medication information at all times in case of emergency situations. These are general instructions for a healthy lifestyle: No smoking/ No tobacco products/ Avoid exposure to second hand smoke Surgeon General's Warning:  Quitting smoking now greatly reduces serious risk to your health. Obesity, smoking, and sedentary lifestyle greatly increases your risk for illness A healthy diet, regular physical exercise & weight monitoring are important for maintaining a healthy lifestyle You may be retaining fluid if you have a history of heart failure or if you experience any of the following symptoms:  Weight gain of 3 pounds or more overnight or 5 pounds in a week, increased swelling in our hands or feet or shortness of breath while lying flat in bed. Please call your doctor as soon as you notice any of these symptoms; do not wait until your next office visit. Recognize signs and symptoms of STROKE: 
 
F-face looks uneven A-arms unable to move or move unevenly S-speech slurred or non-existent T-time-call 911 as soon as signs and symptoms begin-DO NOT go Back to bed or wait to see if you get better-TIME IS BRAIN. Warning Signs of HEART ATTACK Call 911 if you have these symptoms: 
Chest discomfort. Most heart attacks involve discomfort in the center of the chest that lasts more than a few minutes, or that goes away and comes back. It can feel like uncomfortable pressure, squeezing, fullness, or pain. Discomfort in other areas of the upper body.  Symptoms can include pain or discomfort in one or both arms, the back, neck, jaw, or stomach. Shortness of breath with or without chest discomfort. Other signs may include breaking out in a cold sweat, nausea, or lightheadedness. Don't wait more than five minutes to call [address]! Fast action can save your life. Calling 911 is almost always the fastest way to get lifesaving treatment. Emergency Medical Services staff can begin treatment when they arrive  up to an hour sooner than if someone gets to the hospital by car. The discharge information has been reviewed with the patient and spouse. The patient and spouse verbalized understanding. Discharge medications reviewed with the patient and spouse and appropriate educational materials and side effects teaching were provided. Discharge Orders None Introducing hospitals & [facility] SERVICES! Niecy Lewis introduces 1bib patient portal. Now you can access parts of your medical record, email your doctor's office, and request medication refills online. 1. In your internet browser, go to [email or web address] 2. Click on the First Time User? Click Here link in the Sign In box. You will see the New Member Sign Up page. 3. Enter your 1bib Access Code exactly as it appears below. You will not need to use this code after youve completed the sign-up process. If you do not sign up before the expiration date, you must request a new code. · 1bib Access Code: [email or web address] Expires: 11/28/2017 10:11 AM 
 
4. Enter the last four digits of your Social Security Number (xxxx) and Date of Birth (mm/dd/yyyy) as indicated and click Submit. You will be taken to the next sign-up page. 5. Create a Eurotrit ID. This will be your 1bib login ID and cannot be changed, so think of one that is secure and easy to remember. 6. Create a Eurotrit password. You can change your password at any time. 7. Enter your Password Reset Question and Answer. This can be used at a later time if you forget your password. 8. Enter your e-mail address. You will receive e-mail notification when new information is available in [address] 9. Click Sign Up. You can now view and download portions of your medical record. 10. Click the Download Summary menu link to download a portable copy of your medical information. If you have questions, please visit the Frequently Asked Questions section of the Smallable website. Remember, Smallable is NOT to be used for urgent needs. For medical emergencies, dial 911. Now available from your iPhone and Android! General Information Please provide this summary of care documentation to your next provider. Patient Signature:  ____________________________________________________________ Date:  ____________________________________________________________  
  
[employer] Provider Signature:  ____________________________________________________________ Date:  ____________________________________________________________

## 2017-10-19 NOTE — ANESTHESIA PREPROCEDURE EVALUATION
Anesthetic History   No history of anesthetic complications            Review of Systems / Medical History  Patient summary reviewed, nursing notes reviewed and pertinent labs reviewed    Pulmonary  Within defined limits                 Neuro/Psych   Within defined limits           Cardiovascular                  Exercise tolerance: >4 METS     GI/Hepatic/Renal                Endo/Other    Diabetes  Hypothyroidism       Other Findings            Physical Exam    Airway  Mallampati: II  TM Distance: > 6 cm  Neck ROM: normal range of motion   Mouth opening: Normal     Cardiovascular    Rhythm: regular  Rate: normal         Dental  No notable dental hx       Pulmonary                 Abdominal         Other Findings            Anesthetic Plan    ASA: 2  Anesthesia type: MAC          Induction: Intravenous  Anesthetic plan and risks discussed with: Patient

## 2017-10-19 NOTE — H&P
HISTORY OF PRESENT ILLNESS  Jose Braga is a 67 y.o. female. HPI   NEW patient presents for consultation at the request of Dr. Jasmyne Samano for new diagnosis of LEFT breast [facility].  Feels no palpable breast lumps, has no breast pain, no nipple discharge.  Does have a somewhat inverted nipple on the LEFT, but this is not new.  Screening mammogram revealed two abnormal areas which were biopsied separately with stereotactic biopsy.  Second biopsy performed on the LEFT breast was benign, per patient. Teresa Velázquezini a breast MRI yesterday, and the radiologist is recommending a biopsy of a third site in the LEFT breast. Elisabeth Shen is quite bruised after her two recent biopsies.       She has a history of an excisional biopsy of the LEFT breast in 2010 by Dr. Bill Alvarez for atypical lobular hyperplasia.       FH is significant for a paternal aunt who had breast cancer in her 35s (survivor).       All recent imaging has been at [address].            Past Medical History:   Diagnosis Date    Atypical lobular hyperplasia [facility]) of left breast 2010, 2017               Past Surgical History:   Procedure Laterality Date    BIOPSY OF BREAST, INCISIONAL Left 2010     for [facility], by Dr. Laura Amin History            Social History    Marital status:        Spouse name: N/A    Number of children: N/A    Years of education: N/A          Occupational History    Not on file.             Social History Main Topics    Smoking status: Former Smoker       Quit date: 8/30/1979    Smokeless tobacco: Never Used    Alcohol use Yes     Drug use: Not on file    Sexual activity: Not on file           Other Topics Concern    Not on file          Social History Narrative    No narrative on file         No current outpatient prescriptions on file prior to visit.      No current facility-administered medications on file prior to visit.          No Known Allergies     OB History     Obstetric Comments     Menarche:  15. LMP: 39.  # of Children:  3. Age at Delivery of First Child:  21.   Hysterectomy/oophorectomy:  NO/NO. Breast Bx:  Yes, LEFT times three. Hx of Breast Feeding:  Yes. BCP:  No. Hormone therapy:  Yes, in the past.             ROS  Constitutional: Negative    HENT: Negative. Eyes: Negative. Respiratory: Negative. Cardiovascular: Negative. Gastrointestinal: Negative. Genitourinary: Negative. Musculoskeletal: Negative. Skin: Negative. Neurological: Negative. Endo/Heme/Allergies: Negative. Psychiatric/Behavioral: Negative.     Physical Exam   Cardiovascular: Normal rate and normal heart sounds. Pulmonary/Chest: Breath sounds normal. Right breast exhibits no inverted nipple, no mass, no nipple discharge, no skin change and no tenderness. Left breast exhibits no inverted nipple, no mass, no nipple discharge, no skin change and no tenderness. Breasts are symmetrical.       Lymphadenopathy:        Right cervical: No superficial cervical, no deep cervical and no posterior cervical adenopathy present. Left cervical: No superficial cervical, no deep cervical and no posterior cervical adenopathy present. Right axillary: No pectoral and no lateral adenopathy present. Left axillary: No pectoral and no lateral adenopathy present.        BREAST ULTRASOUND, Preop planning  Indication:preop planning  left Breast 12:00   Technique: The area was scanned using a high-frequency linear-array near-field transducer  Findings: Biopsy tract visible with ultrasound  Impression: ALH   Disposition:  Will schedule lumpectomy with wire localization     BREAST ULTRASOUND  Indication: left breast pain 6:00  Technique: The area was scanned using a high-frequency linear-array near-field transducer  Findings: Hyperechoic area of dark fluid  Impression: Small hematoma from bx  Disposition: No worrisome finding on ultrasound     ASSESSMENT and PLAN      ICD-10-CM ICD-9-CM     1.  Atypical lobular hyperplasia (ALH) of left breast N60.92 610.8        Pt with recently dx of LEFT breast ALH and presents today with abnormal MRI of LEFT breast. Discussed in-depth the pathology results and need for surgical excision. Discussed this procedure including incision placement and recovery time.      Will have pt's MRI reviewed by our radiologist and f/u once their input becomes available. Will schedule LEFT breast excision of ALH with wire loc. This plan was reviewed with the patient and patient agrees. All questions were answered.

## 2017-10-19 NOTE — ANESTHESIA POSTPROCEDURE EVALUATION
Post-Anesthesia Evaluation and Assessment    Patient: Zackery Ayala MRN: [id number]  SSN: [id number]    YOB: 1945  Age: 67 y.o. Sex: female       Cardiovascular Function/Vital Signs  Visit Vitals    BP 91/55    Pulse 63    Temp 36.4 °C (97.5 °F)    Resp 10    Ht 5' 5.5\" (1.664 m)    Wt 62.1 kg (137 lb)    SpO2 99%    BMI 22.45 kg/m2       Patient is status post MAC anesthesia for Procedure(s):  LEFT BREAST EXCISIONAL BIOPSY WITH NEEDLE LOCALIZATION. Nausea/Vomiting: None    Postoperative hydration reviewed and adequate. Pain:  Pain Scale 1: Numeric (0 - 10) (10/19/17 1018)  Pain Intensity 1: 0 (10/19/17 1018)   Managed    Neurological Status:   Neuro (WDL): Exceptions to WDL (10/19/17 1018)  Neuro  Neurologic State: Sleeping (10/19/17 1018)   At baseline    Mental Status and Level of Consciousness: Arousable    Pulmonary Status:   O2 Device: Nasal cannula (10/19/17 1022)   Adequate oxygenation and airway patent    Complications related to anesthesia: None    Post-anesthesia assessment completed.  No concerns    Signed By: Kiya Wiseman MD     October 19, 2017

## 2017-10-19 NOTE — BRIEF OP NOTE
BRIEF OPERATIVE NOTE    Date of Procedure: 10/19/2017   Preoperative Diagnosis: ATYPICAL LOBULAR HYPERPLASIA  Postoperative Diagnosis: ATYPICAL LOBULAR HYPERPLASIA    Procedure(s):  LEFT BREAST EXCISIONAL BIOPSY WITH NEEDLE LOCALIZATION  Surgeon(s) and Role:     * Radha Robertson MD - Primary         Assistant Staff:       Surgical Staff:  Circ-1: Melissa Eric RN  Circ-2: Hernandez Dias, OSCAR; Guerrero Streeter RN  Scrub RN-1: Taisha Milligan RN  Event Time In   Incision Start 0957   Incision Close 1012     Anesthesia: MAC   Estimated Blood Loss: minimal  Specimens:   ID Type Source Tests Collected by Time Destination   1 : LEFT BREAST EXCISIONAL BIOPSY Fresh Breast  Radha Robertson MD [id number] Pathology      Findings: spec radiograph pos clip   Complications: none  Implants: * No implants in log *

## 2017-10-24 ENCOUNTER — TELEPHONE (OUTPATIENT)
Dept: SURGERY | Age: 72
End: 2017-10-24

## 2017-11-22 ENCOUNTER — DOCUMENTATION ONLY (OUTPATIENT)
Dept: SURGERY | Age: 72
End: 2017-11-22

## 2017-11-22 ENCOUNTER — OFFICE VISIT (OUTPATIENT)
Dept: SURGERY | Age: 72
End: 2017-11-22

## 2017-11-22 VITALS
SYSTOLIC BLOOD PRESSURE: 108 MMHG | HEIGHT: 66 IN | DIASTOLIC BLOOD PRESSURE: 48 MMHG | HEART RATE: 79 BPM | BODY MASS INDEX: 21.86 KG/M2 | WEIGHT: 136 LBS

## 2017-11-22 DIAGNOSIS — N60.92 ATYPICAL LOBULAR HYPERPLASIA (ALH) OF LEFT BREAST: Primary | ICD-10-CM

## 2017-11-22 RX ORDER — NITROFURANTOIN 25; 75 MG/1; MG/1
CAPSULE ORAL
Refills: 0 | COMMUNITY
Start: 2017-09-29 | End: 2017-11-22

## 2017-11-22 RX ORDER — ESTRADIOL 0.1 MG/G
CREAM VAGINAL
Refills: 0 | COMMUNITY
Start: 2017-10-12

## 2017-11-22 NOTE — PROGRESS NOTES
Type of Film: [x] CD [] FILMS  Type of Test: [] MRI [x] MAMMO  From: [facility]   Given to: Patient to take with her  To be Downloaded into PACS:  YES

## 2017-11-22 NOTE — PROGRESS NOTES
HISTORY OF PRESENT ILLNESS  Jose Braga is a 67 y.o. female. HPI  ESTABLISHED patient here today for follow up  LEFT breast excisional biopsy that revealed stromal fibrosis. The patient states she has healed and the incision shows no signs infection. The patient states she is doing well.    07/16/10: LEFT breast excisional bx at [facility] by Dr. Andres Casiano. Foci of ALH. No ADH, DCIS or invasive carcinoma identified.     08/23/17: LEFT breast stereo bx of scattered foci of ALH.     10/19/17: LEFT breast excision. Changes consistent with prior biopsy site. No evidence of malignancy. Past Medical History:   Diagnosis Date    Atypical lobular hyperplasia [facility]) of left breast 2010, 2017    Cancer ([facility] [address]     BASEL CELLS 10/1/17    Diabetes ([facility] [address]     PRE-DIABETIC.  Thyroid disease     HYPOTHYROIDISM       Past Surgical History:   Procedure Laterality Date    BIOPSY OF BREAST, INCISIONAL Left 2010    [facility], by Dr. Oscar Snyder Left 2017    2 areas    HX BREAST BIOPSY Left 10/19/2017    LEFT BREAST EXCISIONAL BIOPSY WITH NEEDLE LOCALIZATION performed by Raul Garza MD at [facility] AM[facility]UL[facility]    HX GI      COLONOSCOPY WITH BENIGN POLYPS    HX HEENT      TONSILLECTOMY    HX LUMBAR DISKECTOMY  1983       Social History     Social History    Marital status:      Spouse name: N/A    Number of children: N/A    Years of education: N/A     Occupational History    Not on file. Social History Main Topics    Smoking status: Former Smoker     Packs/day: 1.00     Years: 15.00     Quit date: 8/30/1979    Smokeless tobacco: Never Used    Alcohol use Yes      Comment: RARELY.  Drug use: No    Sexual activity: Not on file     Other Topics Concern    Not on file     Social History Narrative       Current Outpatient Prescriptions on File Prior to Visit   Medication Sig Dispense Refill    FEXOFENADINE HCL (ALLEGRA PO) Take  by mouth daily (with breakfast).       PSYLLIUM SEED, WITH DEXTROSE, (FIBER PO) Take  by mouth two (2) times a day.  B.infantis-B.ani-B.long-B.bifi (PROBIOTIC 4X) 10-15 mg TbEC Take  by mouth two (2) times a day.  calcium-cholecalciferol, D3, (CALTRATE 600+D) tablet Take 1 Tab by mouth daily.  aspirin delayed-release 81 mg tablet Take  by mouth daily.  VITAMIN K2 PO Take 100 mg by mouth.  calcium polycarbophil (FIBERCON) 625 mg tablet Take 625 mg by mouth daily.  OTHER CO-Q-10 200 MG      multivitamin (ONE A DAY) tablet Take 1 Tab by mouth daily.  magnesium oxide (MAG-OX) 400 mg tablet Take 400 mg by mouth daily.  cyanocobalamin (VITAMIN B-12) 500 mcg tablet Take 500 mcg by mouth daily.  CRANBERRY FRUIT EXTRACT (THERACRAN PO) Take  by mouth daily.  cholecalciferol (VITAMIN D3) 1,000 unit cap Take  by mouth daily.  Biotin 2,500 mcg cap Take  by mouth.  simvastatin (ZOCOR) 40 mg tablet nightly. 1    trimethoprim-sulfamethoxazole (BACTRIM DS, SEPTRA DS) 160-800 mg per tablet nightly. Indications: PROPHALACTIC FOR BLADDER INFECTION. 1    levothyroxine (SYNTHROID) 75 mcg tablet   0     No current facility-administered medications on file prior to visit. No Known Allergies    OB History     Obstetric Comments    Menarche:  15. LMP: 39.  # of Children:  3. Age at Delivery of First Child:  21.   Hysterectomy/oophorectomy:  NO/NO. Breast Bx:  Yes, LEFT times three. Hx of Breast Feeding:  Yes. BCP:  No. Hormone therapy:  Yes, in the past.             ROS  Constitutional: Negative    HENT: Negative. Eyes: Negative. Respiratory: Negative. Cardiovascular: Negative. Gastrointestinal: Negative. Genitourinary: Negative. Musculoskeletal: Negative. Skin: Negative. Neurological: Negative. Endo/Heme/Allergies: Negative. Psychiatric/Behavioral: Negative. Physical Exam   Cardiovascular: Normal rate and normal heart sounds.     Pulmonary/Chest: Breath sounds normal. Right breast exhibits no inverted nipple, no mass, no nipple discharge, no skin change and no tenderness. Left breast exhibits no inverted nipple, no mass, no nipple discharge, no skin change and no tenderness. Breasts are symmetrical.       Lymphadenopathy:        Right cervical: No superficial cervical, no deep cervical and no posterior cervical adenopathy present. Left cervical: No superficial cervical, no deep cervical and no posterior cervical adenopathy present. Right axillary: No pectoral and no lateral adenopathy present. Left axillary: No pectoral and no lateral adenopathy present. ASSESSMENT and PLAN    ICD-10-CM ICD-9-CM    1. Atypical lobular hyperplasia (ALH) of left breast N60.92 610.8      Pt s/p LEFT breast excisional bx and doing well. Discussed in-depth the pathology results. Using the [address], calculated pt's lifetime risk at >20% for breast cancer due to her hx of ALH. This qualifies her for enrollment into our high risk clinic that includes annual screening breast imaging and follow-up appointments. F/u in 1 year. This plan was reviewed with the patient and patient agrees. All questions were answered.     Written by Gavi Atkinson, as dictated by Dr. Celio Connelly MD.

## 2017-11-22 NOTE — COMMUNICATION BODY
HISTORY OF PRESENT ILLNESS  Jose Braga is a 67 y.o. female. HPI  ESTABLISHED patient here today for follow up  LEFT breast excisional biopsy that revealed stromal fibrosis. The patient states she has healed and the incision shows no signs infection. The patient states she is doing well.    07/16/10: LEFT breast excisional bx at [facility] by Dr. Antonietta Lerma. Foci of ALH. No ADH, DCIS or invasive carcinoma identified.     08/23/17: LEFT breast stereo bx of scattered foci of ALH.     10/19/17: LEFT breast excision. Changes consistent with prior biopsy site. No evidence of malignancy. Past Medical History:   Diagnosis Date    Atypical lobular [facility]) of left breast 2010, 2017    Cancer ([facility] [address]     BASEL CELLS 10/1/17    Diabetes ([facility] [address]     PRE-DIABETIC.  Thyroid disease     HYPOTHYROIDISM       Past Surgical History:   Procedure Laterality Date    BIOPSY OF BREAST, INCISIONAL Left 2010    [email or web address] by Dr. Janna Dumont Left 2017    2 areas    HX BREAST BIOPSY Left 10/19/2017    LEFT BREAST EXCISIONAL BIOPSY WITH NEEDLE LOCALIZATION performed by Terri Marie MD at [facility] AM[facility]ULATORY OR    HX GI      COLONOSCOPY WITH BENIGN POLYPS    HX HEENT      TONSILLECTOMY    HX LUMBAR DISKECTOMY  1983       Social History     Social History    Marital status:      Spouse name: N/A    Number of children: N/A    Years of education: N/A     Occupational History    Not on file. Social History Main Topics    Smoking status: Former Smoker     Packs/day: 1.00     Years: 15.00     Quit date: 8/30/1979    Smokeless tobacco: Never Used    Alcohol use Yes      Comment: RARELY.  Drug use: No    Sexual activity: Not on file     Other Topics Concern    Not on file     Social History Narrative       Current Outpatient Prescriptions on File Prior to Visit   Medication Sig Dispense Refill    FEXOFENADINE HCL (ALLEGRA PO) Take  by mouth daily (with breakfast).       PSYLLIUM SEED, WITH DEXTROSE, (FIBER PO) Take  by mouth two (2) times a day.  B.infantis-B.ani-B.long-B.bifi (PROBIOTIC 4X) 10-15 mg TbEC Take  by mouth two (2) times a day.  calcium-cholecalciferol, D3, (CALTRATE 600+D) tablet Take 1 Tab by mouth daily.  aspirin delayed-release 81 mg tablet Take  by mouth daily.  VITAMIN K2 PO Take 100 mg by mouth.  calcium polycarbophil (FIBERCON) 625 mg tablet Take 625 mg by mouth daily.  OTHER CO-Q-10 200 MG      multivitamin (ONE A DAY) tablet Take 1 Tab by mouth daily.  magnesium oxide (MAG-OX) 400 mg tablet Take 400 mg by mouth daily.  cyanocobalamin (VITAMIN B-12) 500 mcg tablet Take 500 mcg by mouth daily.  CRANBERRY FRUIT EXTRACT (THERACRAN PO) Take  by mouth daily.  cholecalciferol (VITAMIN D3) 1,000 unit cap Take  by mouth daily.  Biotin 2,500 mcg cap Take  by mouth.  simvastatin (ZOCOR) 40 mg tablet nightly. 1    trimethoprim-sulfamethoxazole (BACTRIM DS, SEPTRA DS) 160-800 mg per tablet nightly. Indications: PROPHALACTIC FOR BLADDER INFECTION. 1    levothyroxine (SYNTHROID) 75 mcg tablet   0     No current facility-administered medications on file prior to visit. No Known Allergies    OB History     Obstetric Comments    Menarche:  15. LMP: 39.  # of Children:  3. Age at Delivery of First Child:  21.   Hysterectomy/oophorectomy:  NO/NO. Breast Bx:  Yes, LEFT times three. Hx of Breast Feeding:  Yes. BCP:  No. Hormone therapy:  Yes, in the past.             ROS  Constitutional: Negative    HENT: Negative. Eyes: Negative. Respiratory: Negative. Cardiovascular: Negative. Gastrointestinal: Negative. Genitourinary: Negative. Musculoskeletal: Negative. Skin: Negative. Neurological: Negative. Endo/Heme/Allergies: Negative. Psychiatric/Behavioral: Negative. Physical Exam   Cardiovascular: Normal rate and normal heart sounds.     Pulmonary/Chest: Breath sounds normal. Right breast exhibits no inverted nipple, no mass, no nipple discharge, no skin change and no tenderness. Left breast exhibits no inverted nipple, no mass, no nipple discharge, no skin change and no tenderness. Breasts are symmetrical.       Lymphadenopathy:        Right cervical: No superficial cervical, no deep cervical and no posterior cervical adenopathy present. Left cervical: No superficial cervical, no deep cervical and no posterior cervical adenopathy present. Right axillary: No pectoral and no lateral adenopathy present. Left axillary: No pectoral and no lateral adenopathy present. ASSESSMENT and PLAN    ICD-10-CM ICD-9-CM    1. Atypical lobular hyperplasia (ALH) of left breast N60.92 610.8      Pt s/p LEFT breast excisional bx and doing well. Discussed in-depth the pathology results. Using the [address], calculated pt's lifetime risk at >20% for breast cancer due to her hx of ALH. This qualifies her for enrollment into our high risk clinic that includes annual screening breast imaging and follow-up appointments. F/u in 1 year. This plan was reviewed with the patient and patient agrees. All questions were answered.     Written by Chente Diaz, as dictated by Dr. Gianluca Ochoa MD.

## 2017-11-22 NOTE — PROGRESS NOTES
HISTORY OF PRESENT ILLNESS  Jose Braga is a 67 y.o. female. HPI ESTABLISHED patient here today for follow up  LEFT breast excisional biopsy that revealed stromal fibrosis. The patient states she has healed and the incision shows no signs infection. The patient states she is doing well.  07/16/10: LEFT breast excisional bx at [facility] by Dr. Bina Adrian. Foci of ALH. No ADH, DCIS or invasive carcinoma identified. 08/23/17: LEFT breast stereo bx of scattered foci of ALH.    10/19/17: LEFT breast excision. Changes consistent with prior biopsy site. No evidence of malignancy.     [facility]    Physical Exam    ASSESSMENT and PLAN  {ASSESSMENT/PLAN:[id number]}

## 2019-04-11 ENCOUNTER — HOSPITAL ENCOUNTER (OUTPATIENT)
Dept: MAMMOGRAPHY | Age: 74
Discharge: HOME | End: 2019-04-11
Attending: SURGERY
Payer: COMMERCIAL

## 2019-04-11 DIAGNOSIS — Z12.39 SCREENING BREAST EXAMINATION: ICD-10-CM

## 2019-04-11 PROCEDURE — 77063 BREAST TOMOSYNTHESIS BI: CPT

## 2019-04-12 ENCOUNTER — DOCUMENTATION ONLY (OUTPATIENT)
Dept: SURGERY | Age: 74
End: 2019-04-12

## 2019-04-12 NOTE — PROGRESS NOTES
The patient had called for her mammogram result done yesterday and she was told it was a BI RADS 1 and a very good result. The patient was so happy and appreciated my return call.

## 2020-07-20 ENCOUNTER — HOSPITAL ENCOUNTER (OUTPATIENT)
Dept: MAMMOGRAPHY | Age: 75
Discharge: HOME | End: 2020-07-20
Attending: SURGERY
Payer: COMMERCIAL

## 2020-07-20 DIAGNOSIS — Z12.31 VISIT FOR SCREENING MAMMOGRAM: ICD-10-CM

## 2020-07-20 PROCEDURE — 77063 BREAST TOMOSYNTHESIS BI: CPT

## 2021-06-28 ENCOUNTER — TRANSCRIBE ORDER (OUTPATIENT)
Dept: SCHEDULING | Age: 76
End: 2021-06-28

## 2021-06-28 DIAGNOSIS — Z12.31 VISIT FOR SCREENING MAMMOGRAM: Primary | ICD-10-CM

## 2021-08-25 ENCOUNTER — HOSPITAL ENCOUNTER (OUTPATIENT)
Dept: MAMMOGRAPHY | Age: 76
Discharge: HOME | End: 2021-08-25
Attending: SURGERY
Payer: COMMERCIAL

## 2021-08-25 DIAGNOSIS — Z12.31 VISIT FOR SCREENING MAMMOGRAM: ICD-10-CM

## 2021-08-25 PROCEDURE — 77063 BREAST TOMOSYNTHESIS BI: CPT

## 2022-03-18 PROBLEM — N60.92 ATYPICAL LOBULAR HYPERPLASIA (ALH) OF LEFT BREAST: Status: ACTIVE | Noted: 2017-08-30

## 2022-08-10 ENCOUNTER — TRANSCRIBE ORDER (OUTPATIENT)
Dept: SCHEDULING | Age: 77
End: 2022-08-10

## 2022-08-10 DIAGNOSIS — Z12.31 SCREENING MAMMOGRAM FOR HIGH-RISK PATIENT: Primary | ICD-10-CM

## 2022-09-28 ENCOUNTER — HOSPITAL ENCOUNTER (OUTPATIENT)
Dept: MAMMOGRAPHY | Age: 77
Discharge: HOME | End: 2022-09-28
Attending: FAMILY MEDICINE
Payer: MEDICARE

## 2022-09-28 DIAGNOSIS — Z12.31 SCREENING MAMMOGRAM FOR HIGH-RISK PATIENT: ICD-10-CM

## 2022-09-28 PROCEDURE — 77063 BREAST TOMOSYNTHESIS BI: CPT

## 2023-04-23 ENCOUNTER — HOSPITAL ENCOUNTER (EMERGENCY)
Age: 78
Discharge: HOME | End: 2023-04-23
Attending: EMERGENCY MEDICINE
Payer: MEDICARE

## 2023-04-23 ENCOUNTER — APPOINTMENT (OUTPATIENT)
Dept: CT IMAGING | Age: 78
End: 2023-04-23
Attending: NURSE PRACTITIONER
Payer: MEDICARE

## 2023-04-23 VITALS
RESPIRATION RATE: 18 BRPM | TEMPERATURE: 97.6 F | OXYGEN SATURATION: 97 % | SYSTOLIC BLOOD PRESSURE: 145 MMHG | HEART RATE: 63 BPM | DIASTOLIC BLOOD PRESSURE: 92 MMHG

## 2023-04-23 DIAGNOSIS — S09.90XA INJURY OF HEAD, INITIAL ENCOUNTER: Primary | ICD-10-CM

## 2023-04-23 PROCEDURE — 74011250636 HC RX REV CODE- 250/636: Performed by: NURSE PRACTITIONER

## 2023-04-23 PROCEDURE — 99284 EMERGENCY DEPT VISIT MOD MDM: CPT

## 2023-04-23 PROCEDURE — 90714 TD VACC NO PRESV 7 YRS+ IM: CPT | Performed by: NURSE PRACTITIONER

## 2023-04-23 PROCEDURE — 90471 IMMUNIZATION ADMIN: CPT

## 2023-04-23 PROCEDURE — 74011000250 HC RX REV CODE- 250: Performed by: NURSE PRACTITIONER

## 2023-04-23 PROCEDURE — 70450 CT HEAD/BRAIN W/O DYE: CPT

## 2023-04-23 RX ORDER — BACITRACIN 500 UNIT/G
PACKET (EA) TOPICAL
Status: DISCONTINUED
Start: 2023-04-23 | End: 2023-04-23 | Stop reason: HOSPADM

## 2023-04-23 RX ORDER — BACITRACIN 500 [USP'U]/G
14 OINTMENT TOPICAL 3 TIMES DAILY
Qty: 14 G | Refills: 0 | Status: SHIPPED | OUTPATIENT
Start: 2023-04-23 | End: 2023-05-03

## 2023-04-23 RX ORDER — BACITRACIN 500 UNIT/G
1 PACKET (EA) TOPICAL 3 TIMES DAILY
Status: DISCONTINUED | OUTPATIENT
Start: 2023-04-23 | End: 2023-04-23 | Stop reason: HOSPADM

## 2023-04-23 RX ADMIN — CLOSTRIDIUM TETANI TOXOID ANTIGEN (FORMALDEHYDE INACTIVATED) AND CORYNEBACTERIUM DIPHTHERIAE TOXOID ANTIGEN (FORMALDEHYDE INACTIVATED) 0.5 ML: 5; 2 INJECTION, SUSPENSION INTRAMUSCULAR at 19:26

## 2023-04-23 RX ADMIN — Medication 1 PACKET: at 19:36

## 2023-09-25 ENCOUNTER — TRANSCRIBE ORDERS (OUTPATIENT)
Facility: HOSPITAL | Age: 78
End: 2023-09-25

## 2023-09-25 DIAGNOSIS — Z12.31 SCREENING MAMMOGRAM FOR HIGH-RISK PATIENT: Primary | ICD-10-CM

## 2023-10-20 ENCOUNTER — HOSPITAL ENCOUNTER (OUTPATIENT)
Facility: HOSPITAL | Age: 78
End: 2023-10-20
Payer: MEDICARE

## 2023-10-20 VITALS — HEIGHT: 65 IN | WEIGHT: 138 LBS | BODY MASS INDEX: 22.99 KG/M2

## 2023-10-20 DIAGNOSIS — Z12.31 SCREENING MAMMOGRAM FOR HIGH-RISK PATIENT: ICD-10-CM

## 2023-10-20 PROCEDURE — 77063 BREAST TOMOSYNTHESIS BI: CPT

## (undated) DEVICE — (D)PREP SKN CHLRAPRP APPL 26ML -- CONVERT TO ITEM 371833

## (undated) DEVICE — KENDALL SCD EXPRESS SLEEVES, KNEE LENGTH, MEDIUM: Brand: KENDALL SCD

## (undated) DEVICE — INFECTION CONTROL KIT SYS

## (undated) DEVICE — SURGICAL PROCEDURE PACK BASIN MAJ SET CUST NO CAUT

## (undated) DEVICE — SOLUTION IV 1000ML 0.9% SOD CHL

## (undated) DEVICE — SUT SLK 2-0SH 30IN BLK --

## (undated) DEVICE — INSULATED BLADE ELECTRODE: Brand: EDGE

## (undated) DEVICE — SUTURE VCRL SZ 3-0 L27IN ABSRB UD L26MM SH 1/2 CIR J416H

## (undated) DEVICE — SMOKE EVACUATION PENCIL: Brand: VALLEYLAB

## (undated) DEVICE — DRAPE,LAPAROTOMY,T,PEDI,STERILE: Brand: MEDLINE

## (undated) DEVICE — STERILE POLYISOPRENE POWDER-FREE SURGICAL GLOVES: Brand: PROTEXIS

## (undated) DEVICE — Device

## (undated) DEVICE — 1010 S-DRAPE TOWEL DRAPE 10/BX: Brand: STERI-DRAPE™

## (undated) DEVICE — NEEDLE HYPO 25GA L1.5IN BVL ORIENTED ECLIPSE

## (undated) DEVICE — SUTURE MCRYL SZ 4-0 L27IN ABSRB UD L19MM PS-2 1/2 CIR PRIM Y426H

## (undated) DEVICE — REM POLYHESIVE ADULT PATIENT RETURN ELECTRODE: Brand: VALLEYLAB

## (undated) DEVICE — DERMABOND SKIN ADH 0.7ML -- DERMABOND ADVANCED 12/BX

## (undated) DEVICE — (D)SYR 10ML 1/5ML GRAD NSAF -- PKGING CHANGE USE ITEM 338027